# Patient Record
Sex: MALE | URBAN - METROPOLITAN AREA
[De-identification: names, ages, dates, MRNs, and addresses within clinical notes are randomized per-mention and may not be internally consistent; named-entity substitution may affect disease eponyms.]

---

## 2017-09-11 ENCOUNTER — THERAPY VISIT (OUTPATIENT)
Dept: PHYSICAL THERAPY | Facility: CLINIC | Age: 32
End: 2017-09-11
Payer: COMMERCIAL

## 2017-09-11 DIAGNOSIS — M54.42 BILATERAL LOW BACK PAIN WITH LEFT-SIDED SCIATICA: Primary | ICD-10-CM

## 2017-09-11 PROCEDURE — 97161 PT EVAL LOW COMPLEX 20 MIN: CPT | Mod: GP | Performed by: PHYSICAL THERAPIST

## 2017-09-11 PROCEDURE — 97110 THERAPEUTIC EXERCISES: CPT | Mod: GP | Performed by: PHYSICAL THERAPIST

## 2017-09-11 PROCEDURE — 97112 NEUROMUSCULAR REEDUCATION: CPT | Mod: GP | Performed by: PHYSICAL THERAPIST

## 2017-09-11 NOTE — MR AVS SNAPSHOT
After Visit Summary   9/11/2017    Perfecto Higgins    MRN: [id number]           Patient Information     Date Of Birth          1985        Visit Information        Provider Department      9/11/2017 3:00 PM Elzbieta Raphael, PT [facility]        Today's Diagnoses     Bilateral low back pain with left-sided sciatica    -  1       Follow-ups after your visit        Your next 10 appointments already scheduled     Sep 18, 2017  3:00 PM CDT   REE Spine with Elzbieta Raphael PT   [facility] ([facility])    [address]  [facility] [id number] [phone]            Sep 25, 2017  4:20 PM CDT   REE Spine with Elzbieta Raphael PT   [facility] ([facility])    [address]  [facility] [id number] [phone]              Who to contact     If you have questions or need follow up information about today's clinic visit or your schedule please contact [facility] directly at [phone].  Normal or non-critical lab and imaging results will be communicated to you by MyChart, letter or phone within 4 business days after the clinic has received the results. If you do not hear from us within 7 days, please contact the clinic through [employer] or phone. If you have a critical or abnormal lab result, we will notify you by phone as soon as possible.  Submit refill requests through [employer] or call your pharmacy and they will forward the refill request to us. Please allow 3 business days for your refill to be completed.          Additional Information About Your Visit        MyChart Information     [employer] lets you send messages to your doctor, view your test results, renew your prescriptions, schedule appointments and more. To sign up, go to [email or web address] .  "Click on \"Log in\" on the left side of the screen, which will take you to the Welcome page. Then click on \"Sign up Now\" on the right side of the page.     You will be asked to enter the access code listed below, as well as some personal information. Please follow the directions to create your username and password.     Your access code is: [email or web address]  Expires: 2017  8:55 AM     Your access code will  in 90 days. If you need help or a new code, please call your [place] clinic or [phone].        Care EveryWhere ID     This is your Care EveryWhere ID. This could be used by other organizations to access your [place] medical records  [id number]         Blood Pressure from Last 3 Encounters:   No data found for BP    Weight from Last 3 Encounters:   No data found for Wt              We Performed the Following     REE Inital Eval Report     Neuromuscular Re-Education     PT Eval, Low Complexity (33557)     Therapeutic Exercises        Primary Care Provider    None Specified       No primary provider on file.        Equal Access to Services     [facility] : Hadii angelo tran hadasho Soomaali, waaxda luqadaha, qaybta kaalmada adeluis e, scot oropeza . So [facility] [phone].    ATENCIÓN: Si habla español, tiene a sood disposición servicios gratuitos de asistencia lingüística. Llame al [phone].    We comply with applicable federal civil rights laws and [place] laws. We do not discriminate on the basis of race, color, national origin, age, disability sex, sexual orientation or gender identity.            Thank you!     Thank you for choosing [facility]  for your care. Our goal is always to provide you with excellent care. Hearing back from our patients is one way we can continue to improve our services. Please take a few minutes to complete the written survey that you may receive in the mail after your visit with us. Thank " you!             Your Updated Medication List - Protect others around you: Learn how to safely use, store and throw away your medicines at www.disposemymeds.org.      Notice  As of 9/11/2017 11:59 PM    You have not been prescribed any medications.

## 2017-09-11 NOTE — PROGRESS NOTES
[facility] Initial Evaluation    Subjective:    Patient is a 32 year old male presenting with rehab back hpi. The history is provided by the patient. No  was used.   Perfecto Higgins is a 32 year old male with a lumbar condition.  Condition occurred with:  Repetition/overuse.  Condition occurred: at home.  This is a new condition  Patient reports that in June of 2017, he was helping his mom and a friend move and was also making a bench in his back yard which required a lot of lifting work and then bending to make the bench. He noted insidioius onset of lower back pain with this and the pain would come and go. He states that in August, he noted onset of left leg pain. MD order from 9-1-17. He states that he does have a torn meniscus in the L knee, so the left knee pain may be from that as well..    Patient reports pain:  Central lumbar spine and lumbar spine left.  Radiates to:  Gluteals left, foot left, lower leg left and thigh left (posterior knee and ball of the left foot, intermittent posterior L thigh and lower leg).  Pain is described as sharp and aching (inter sharp in back of knee and ache in lower back) and is constant and reported as 5/10.  Associated symptoms:  Tingling and numbness (T>>N in L foot). Pain is worse in the P.M. (pain based more on his activity).  Symptoms are exacerbated by sitting, lifting and bending (sit mua 1 hour with 5-6/10 pain, don shoes and socks with 5-6/10 pain, lift laundry basket/groceries with 5/10 pain, occas awakens at night) and relieved by activity/movement.  Since onset symptoms are unchanged.        General health as reported by patient is good.  Pertinent medical history includes:  Overweight and anemia.  Medical allergies: no.  Other surgeries include:  Other (kidney stones 2012 and 2017, gallbladder removed 206).    Current occupation is .  Patient is working in normal job without restrictions.  Primary job tasks include:   Lifting, prolonged sitting and other (computer work).    Barriers include:  None as reported by the patient.    Red flags:  None as reported by the patient.                        Objective:    System         Lumbar/SI Evaluation  ROM:    AROM Lumbar:   Flexion:          Fingertips to toes and no change  Ext:                    Mod loss and no change   Side Bend:        Left:     Right:   Rotation:           Left:     Right:   Side Glide:        Left:   min loss and no change    Right:  Mod loss and ache          Lumbar Myotomes:            S1 (Toe Raise):  Left: 5-      Lumbar DTR's:  normal      Cord Signs:  normal    Lumbar Dermtomes:  normal                Neural Tension/Mobility:  Lumbar:  Normal        Lumbar Palpation:    Tenderness present at Left:    Quadratus Lumborum and Erector Spinae                                                         Geetha Lumbar Evaluation    Posture:  Sitting: poor  Standing: fair  Lordosis: Reduced  Lateral Shift: no  Correction of Posture: better      Test Movements:  FIS: During: no effect  After: no effect    Repeat FIS: During: increases  After: worse    EIS: During: no effect  After: no effect    Repeat EIS: During: no effect  After: no effect    SALMA: During: no effect  After: no effect    Repeat SALMA: During: no effect  After: no effect    EIL: During: decreases  After: better  Mechanical Response: IncROM          Conclusion: derangement                                         ROS    Assessment/Plan:      Patient is a 32 year old male with lumbar complaints.    Patient has the following significant findings with corresponding treatment plan.                Diagnosis 1:  Lumbar derangement  Pain -  manual therapy, self management, education, directional preference exercise and home program  Decreased ROM/flexibility - manual therapy, therapeutic exercise and home program  Decreased strength - therapeutic exercise, therapeutic activities and home program  Impaired muscle  performance - neuro re-education and home program  Decreased function - therapeutic activities and home program  Impaired posture - neuro re-education and home program    Therapy Evaluation Codes:   1) History comprised of:   Personal factors that impact the plan of care:      None.    Comorbidity factors that impact the plan of care are:      None.     Medications impacting care: None.  2) Examination of Body Systems comprised of:   Body structures and functions that impact the plan of care:      Lumbar spine.   Activity limitations that impact the plan of care are:      Bending, Driving, Dressing, Lifting, Sitting and Sleeping.  3) Clinical presentation characteristics are:   Stable/Uncomplicated.  4) Decision-Making    Low complexity using standardized patient assessment instrument and/or measureable assessment of functional outcome.  Cumulative Therapy Evaluation is: Low complexity.    Previous and current functional limitations:  (See Goal Flow Sheet for this information)    Short term and Long term goals: (See Goal Flow Sheet for this information)     Communication ability:  Patient appears to be able to clearly communicate and understand verbal and written communication and follow directions correctly.  Treatment Explanation - The following has been discussed with the patient:   RX ordered/plan of care  Anticipated outcomes  Possible risks and side effects  This patient would benefit from PT intervention to resume normal activities.   Rehab potential is good.    Frequency:  1-2 X week, once daily  Duration:  for 8 weeks  Discharge Plan:  Achieve all LTG.  Independent in home treatment program.  Reach maximal therapeutic benefit.    Please refer to the daily flowsheet for treatment today, total treatment time and time spent performing 1:1 timed codes.

## 2017-09-11 NOTE — LETTER
[facility] PHYSICAL [facility]  [address]  [place] [id number] [phone]    2017    Re: Perfecto Higgins   :   1985  MRN:  [id number]   REFERRING PHYSICIAN:   Good Farrar    [facility]    Date of Initial Evaluation:  2017  Visits:  Rxs Used: 1  Reason for Referral:  Bilateral low back pain with left-sided sciatica    EVALUATION SUMMARY    [facility]    Subjective:  Patient is a 32 year old male presenting with rehab back hpi. The history is provided by the patient. No  was used.   Perfecto Higgins is a 32 year old male with a lumbar condition.  Condition occurred with:  Repetition/overuse.  Condition occurred: at home.  This is a new condition  Patient reports that in 2017, he was helping his mom and a friend move and was also making a bench in his back yard which required a lot of lifting work and then bending to make the bench. He noted insidioius onset of lower back pain with this and the pain would come and go. He states that in August, he noted onset of left leg pain. MD order from 17. He states that he does have a torn meniscus in the L knee, so the left knee pain may be from that as well..    Patient reports pain:  Central lumbar spine and lumbar spine left. Radiates to: Gluteals left, foot left, lower leg left and thigh left (posterior knee and ball of the left foot, intermittent posterior L thigh and lower leg). Pain is described as sharp and aching (inter sharp in back of knee and ache in lower back) and is constant and reported as 5/10.  Associated symptoms: Tingling and numbness (T>>N in L foot). Pain is worse in the P.M. (pain based more on his activity). Symptoms are exacerbated by sitting, lifting and bending (sit uma 1 hour with 5-6/10 pain, don shoes and socks with 5-6/10 pain, lift laundry  basket/groceries with 5/10 pain, occas awakens at night) and relieved by activity/movement. Since onset symptoms are unchanged. General health as reported by patient is good. Pertinent medical history includes: Overweight and anemia.  Medical allergies: no. Other surgeries include: Other (kidney stones 2012 and 2017, gallbladder removed ). Current occupation is . Patient is working in normal job without restrictions. Primary job tasks include: Lifting, prolonged sitting and other (computer work).  Barriers include:  None as reported by the patient.  Red flags:  None as reported by the patient.      Lumbar/SI Evaluation  Flexion:        Fingertips to toes and no change  Ext:              Mod loss and no change     Re: Perfecto Higgins   :   1985    Side Bend:   Left:     Right:   Rotation:      Left:     Right:   Side Glide:   Left:   min loss and no change    Right:  Mod loss and ache     S1 (Toe Raise):  Left: 5-      Lumbar DTR's:  normal  Cord Signs:  normal  Lumbar Dermtomes:  normal  Neural Tension/Mobility:  Lumbar:  Normal    Tenderness present at Left:    Quadratus Lumborum and Erector Spinae    Geetha Lumbar Evaluation  Sitting: poor  Standing: fair  Lordosis: Reduced  Lateral Shift: no  Correction of Posture: better  Test Movements:  FIS: During: no effect  After: no effect    Repeat FIS: During: increases  After: worse    EIS: During: no effect  After: no effect    Repeat EIS: During: no effect  After: no effect    SALMA: During: no effect  After: no effect    Repeat SALMA: During: no effect  After: no effect    EIL: During: decreases  After: better  Mechanical Response: IncROM  Conclusion: derangement    Assessment/Plan:    Patient is a 32 year old male with lumbar complaints.    Patient has the following significant findings with corresponding treatment plan.                Diagnosis 1:  Lumbar derangement  Pain -  manual therapy, self management, education, directional preference  exercise and home program  Decreased ROM/flexibility - manual therapy, therapeutic exercise and home program  Decreased strength - therapeutic exercise, therapeutic activities and home program  Impaired muscle performance - neuro re-education and home program  Decreased function - therapeutic activities and home program  Impaired posture - neuro re-education and home program    Therapy Evaluation Codes:   1) History comprised of:   Personal factors that impact the plan of care:      None.    Comorbidity factors that impact the plan of care are:      None.     Medications impacting care: None.  2) Examination of Body Systems comprised of:   Body structures and functions that impact the plan of care:      Lumbar spine.   Activity limitations that impact the plan of care are:      Bending, Driving, Dressing, Lifting, Sitting and Sleeping.    Re: Perfecto Higgins   :   1985      3) Clinical presentation characteristics are:   Stable/Uncomplicated.  4) Decision-Making    Low complexity using standardized patient assessment instrument and/or measureable assessment of functional outcome.  Cumulative Therapy Evaluation is: Low complexity.    Previous and current functional limitations:  (See Goal Flow Sheet for this information)    Short term and Long term goals: (See Goal Flow Sheet for this information)     Communication ability:  Patient appears to be able to clearly communicate and understand verbal and written communication and follow directions correctly.  Treatment Explanation - The following has been discussed with the patient:   RX ordered/plan of care  Anticipated outcomes  Possible risks and side effects  This patient would benefit from PT intervention to resume normal activities.   Rehab potential is good.  Frequency:  1-2 X week, once daily  Duration:  for 8 weeks  Discharge Plan:  Achieve all LTG.  Independent in home treatment program.  Reach maximal therapeutic benefit.    Thank you for your  referral.      INQUIRIES  Therapist: Elzbieta Raphael,    [facility]  [address]  [place] [address]  Phone: [phone]  Fax: [phone]

## 2017-09-12 PROBLEM — M54.42 BILATERAL LOW BACK PAIN WITH LEFT-SIDED SCIATICA: Status: ACTIVE | Noted: 2017-09-12

## 2017-09-18 ENCOUNTER — THERAPY VISIT (OUTPATIENT)
Dept: PHYSICAL THERAPY | Facility: CLINIC | Age: 32
End: 2017-09-18
Payer: COMMERCIAL

## 2017-09-18 DIAGNOSIS — M54.42 BILATERAL LOW BACK PAIN WITH LEFT-SIDED SCIATICA: ICD-10-CM

## 2017-09-18 PROCEDURE — 97112 NEUROMUSCULAR REEDUCATION: CPT | Mod: GP | Performed by: PHYSICAL THERAPIST

## 2017-09-18 PROCEDURE — 97140 MANUAL THERAPY 1/> REGIONS: CPT | Mod: GP | Performed by: PHYSICAL THERAPIST

## 2017-09-18 PROCEDURE — 97110 THERAPEUTIC EXERCISES: CPT | Mod: GP | Performed by: PHYSICAL THERAPIST

## 2017-09-18 NOTE — MR AVS SNAPSHOT
After Visit Summary   9/18/2017    Perfecto Higgins    MRN: [id number]           Patient Information     Date Of Birth          1985        Visit Information        Provider Department      9/18/2017 3:00 PM Elzbieta Raphael, PT [facility]        Today's Diagnoses     Bilateral low back pain with left-sided sciatica           Follow-ups after your visit        Your next 10 appointments already scheduled     Sep 25, 2017  4:20 PM CDT   REE Spine with Elzbieta Raphael PT   [facility] ([facility])    [address]  [facility] [id number] [phone]            Oct 02, 2017  3:40 PM CDT   REE Spine with Elzbieta Raphael PT   [facility] ([facility])    [address]  [facility] [id number] [phone]              Who to contact     If you have questions or need follow up information about today's clinic visit or your schedule please contact [facility] directly at [phone].  Normal or non-critical lab and imaging results will be communicated to you by MyChart, letter or phone within 4 business days after the clinic has received the results. If you do not hear from us within 7 days, please contact the clinic through [employer] or phone. If you have a critical or abnormal lab result, we will notify you by phone as soon as possible.  Submit refill requests through [employer] or call your pharmacy and they will forward the refill request to us. Please allow 3 business days for your refill to be completed.          Additional Information About Your Visit        MyChart Information     [employer] lets you send messages to your doctor, view your test results, renew your prescriptions, schedule appointments and more. To sign up, go to [email or web address] .  "Click on \"Log in\" on the left side of the screen, which will take you to the Welcome page. Then click on \"Sign up Now\" on the right side of the page.     You will be asked to enter the access code listed below, as well as some personal information. Please follow the directions to create your username and password.     Your access code is: [email or web address]  Expires: 2017  8:55 AM     Your access code will  in 90 days. If you need help or a new code, please call your [place] clinic or [phone].        Care EveryWhere ID     This is your Care EveryWhere ID. This could be used by other organizations to access your [place] medical records  [id number]         Blood Pressure from Last 3 Encounters:   No data found for BP    Weight from Last 3 Encounters:   No data found for Wt              We Performed the Following     Manual Ther Tech, 1+Regions, EA 15 min     Neuromuscular Re-Education     Therapeutic Exercises        Primary Care Provider    None Specified       No primary provider on file.        Equal Access to Services     [facility]: Hadii angelo ku hadasho Soomaali, waaxda luqadaha, qaybta kaalmada adeegyacande, scot oropeza . So [facility] [phone].    ATENCIÓN: Si habla español, tiene a sood disposición servicios gratuitos de asistencia lingüística. Llame al [phone].    We comply with applicable federal civil rights laws and [place] laws. We do not discriminate on the basis of race, color, national origin, age, disability sex, sexual orientation or gender identity.            Thank you!     Thank you for choosing [facility]  for your care. Our goal is always to provide you with excellent care. Hearing back from our patients is one way we can continue to improve our services. Please take a few minutes to complete the written survey that you may receive in the mail after your visit with us. Thank you!             Your " Updated Medication List - Protect others around you: Learn how to safely use, store and throw away your medicines at www.disposemymeds.org.      Notice  As of 9/18/2017 11:59 PM    You have not been prescribed any medications.

## 2017-09-18 NOTE — PROGRESS NOTES
Subjective:    HPI                    Objective:    System    Physical Exam    General     ROS    Assessment/Plan:      SUBJECTIVE  Subjective changes as noted by pt:  Patient reports that he has more lower back pain, but decrease in his left foot symptoms. He has been doing the exercise about 5 times a day, but more on the weekend.        Current pain level: 6/10     Changes in function:  Yes (See Goal flowsheet attached for changes in current functional level)     Adverse reaction to treatment or activity:  None    OBJECTIVE  Changes in objective findings:  Standing LROM: FB with fingertips to toes and increase L leg Sx, BB mod loss and tightness in lower back, right side glide min loss and no change, and left side glide min loss and no change. Following REIL and manual lumbar extension mobs, pain decreased significantly and improved B side bends.         ASSESSMENT  Perfecto continues to require intervention to meet STG and LTG's: PT  Patient's symptoms are resolving.  Patient is progressing as expected.  Response to therapy has shown an improvement in  pain level, radicular symptoms, posture and function  Progress made towards STG/LTG?  Yes (See Goal flowsheet attached for updates on achievement of STG and LTG)    PLAN  Continue current treatment plan until patient demonstrates readiness to progress to higher level exercises.  The following procedures have been added:  manual therapy    PTA/ATC plan:  N/A    Please refer to the daily flowsheet for treatment today, total treatment time and time spent performing 1:1 timed codes.

## 2017-09-25 ENCOUNTER — THERAPY VISIT (OUTPATIENT)
Dept: PHYSICAL THERAPY | Facility: CLINIC | Age: 32
End: 2017-09-25
Payer: COMMERCIAL

## 2017-09-25 DIAGNOSIS — M54.42 BILATERAL LOW BACK PAIN WITH LEFT-SIDED SCIATICA: ICD-10-CM

## 2017-09-25 PROCEDURE — 97110 THERAPEUTIC EXERCISES: CPT | Mod: GP | Performed by: PHYSICAL THERAPIST

## 2017-09-25 PROCEDURE — 97140 MANUAL THERAPY 1/> REGIONS: CPT | Mod: GP | Performed by: PHYSICAL THERAPIST

## 2017-09-25 NOTE — PROGRESS NOTES
Subjective:    HPI                    Objective:    System    Physical Exam    General     ROS    Assessment/Plan:      SUBJECTIVE  Subjective changes as noted by pt:  Patient reports that he is getting there press ups done 6-8 times a day. He is now standing a lot for meetings, etc at work instead of sitting which aggravates his pain. He is having less pain in the left foot, but if he sits too long it will come on at that time. He feels he has improved by about 25% since starting therapy. His biggest complaint is that he still can't sit much yet. Even the 15' drive home will cause some left foot pain and posterior left thigh pain.        Current pain level: 4/10     Changes in function:  Yes (See Goal flowsheet attached for changes in current functional level)     Adverse reaction to treatment or activity:  None    OBJECTIVE  Changes in objective findings:  Standing LROM: FB with fingertips to toes and slight increase L thigh/foot Sx, left side glide WNL and no pain, and right side glide WNL and no pain. L PF strength now 5/5.        ASSESSMENT  Perfecto continues to require intervention to meet STG and LTG's: PT  Patient's symptoms are resolving.  Response to therapy has shown an improvement in  pain level, radicular symptoms, posture, body mechanics and function  Progress made towards STG/LTG?  Yes (See Goal flowsheet attached for updates on achievement of STG and LTG)    PLAN  Current treatment program is being advanced to more complex exercises.    PTA/ATC plan:  N/A    Please refer to the daily flowsheet for treatment today, total treatment time and time spent performing 1:1 timed codes.

## 2017-09-25 NOTE — MR AVS SNAPSHOT
After Visit Summary   9/25/2017    Perfecto Higgins    MRN: [id number]           Patient Information     Date Of Birth          1985        Visit Information        Provider Department      9/25/2017 4:20 PM Elzbieta Raphael, PT [facility]        Today's Diagnoses     Bilateral low back pain with left-sided sciatica           Follow-ups after your visit        Your next 10 appointments already scheduled     Oct 02, 2017  3:40 PM CDT   REE Spine with Elzbieta Raphael PT   [facility] ([facility])    [address]  [facility] [id number] [phone]            Oct 09, 2017  4:20 PM CDT   REE Spine with Elzbieta Raphael PT   [facility] ([facility])    [address]  [facility] [address] [phone]              Who to contact     If you have questions or need follow up information about today's clinic visit or your schedule please contact [facility] PHYSICAL THERAPY directly at [phone].  Normal or non-critical lab and imaging results will be communicated to you by MyChart, letter or phone within 4 business days after the clinic has received the results. If you do not hear from us within 7 days, please contact the clinic through [employer] or phone. If you have a critical or abnormal lab result, we will notify you by phone as soon as possible.  Submit refill requests through [employer] or call your pharmacy and they will forward the refill request to us. Please allow 3 business days for your refill to be completed.          Additional Information About Your Visit        MyChart Information     [employer] lets you send messages to your doctor, view your test results, renew your prescriptions, schedule appointments and more. To sign up, go to [email or web address] .  "Click on \"Log in\" on the left side of the screen, which will take you to the Welcome page. Then click on \"Sign up Now\" on the right side of the page.     You will be asked to enter the access code listed below, as well as some personal information. Please follow the directions to create your username and password.     Your access code is: [email or web address]  Expires: 2017  8:55 AM     Your access code will  in 90 days. If you need help or a new code, please call your [place] clinic or [phone].        Care EveryWhere ID     This is your Care EveryWhere ID. This could be used by other organizations to access your [place] medical records  [id number]         Blood Pressure from Last 3 Encounters:   No data found for BP    Weight from Last 3 Encounters:   No data found for Wt              We Performed the Following     Manual Ther Tech, 1+Regions, EA 15 min     Therapeutic Exercises        Primary Care Provider    None Specified       No primary provider on file.        Equal Access to Services     [facility]: Hadii aad ku hadasho Soomaali, waaxda luqadaha, qaybta kaalmada adeegyada, waxay alicia oropeza . So [facility] [place] [phone].    ATENCIÓN: Si habla español, tiene a sood disposición servicios gratuitos de asistencia lingüística. Llame al [phone].    We comply with applicable federal civil rights laws and [place] laws. We do not discriminate on the basis of race, color, national origin, age, disability sex, sexual orientation or gender identity.            Thank you!     Thank you for choosing [facility] THERAP[facility]  for your care. Our goal is always to provide you with excellent care. Hearing back from our patients is one way we can continue to improve our services. Please take a few minutes to complete the written survey that you may receive in the mail after your visit with us. Thank you!             Your Updated Medication List - Protect " others around you: Learn how to safely use, store and throw away your medicines at www.disposemymeds.org.      Notice  As of 9/25/2017  5:36 PM    You have not been prescribed any medications.

## 2017-10-02 ENCOUNTER — THERAPY VISIT (OUTPATIENT)
Dept: PHYSICAL THERAPY | Facility: CLINIC | Age: 32
End: 2017-10-02
Payer: COMMERCIAL

## 2017-10-02 DIAGNOSIS — M54.42 BILATERAL LOW BACK PAIN WITH LEFT-SIDED SCIATICA: ICD-10-CM

## 2017-10-02 PROCEDURE — 97140 MANUAL THERAPY 1/> REGIONS: CPT | Mod: GP | Performed by: PHYSICAL THERAPIST

## 2017-10-02 PROCEDURE — 97110 THERAPEUTIC EXERCISES: CPT | Mod: GP | Performed by: PHYSICAL THERAPIST

## 2017-10-02 NOTE — MR AVS SNAPSHOT
After Visit Summary   10/2/2017    Perfecto Higgins    MRN: [id number]           Patient Information     Date Of Birth          1985        Visit Information        Provider Department      10/2/2017 3:40 PM Elzbieta Raphael, PT [facility]        Today's Diagnoses     Bilateral low back pain with left-sided sciatica           Follow-ups after your visit        Your next 10 appointments already scheduled     Oct 09, 2017  4:20 PM CDT   REE Spine with Elzbieta Raphael PT   [facility] ([facility])    [address]  [facility] [id number] [phone]            Oct 16, 2017  4:20 PM CDT   REE Spine with Elzbieta Raphael PT   [facility] ([facility])    [address]  [facility] [id number] [phone]              Who to contact     If you have questions or need follow up information about today's clinic visit or your schedule please contact [facility] PHYSICAL THERAPY directly at [phone].  Normal or non-critical lab and imaging results will be communicated to you by MyChart, letter or phone within 4 business days after the clinic has received the results. If you do not hear from us within 7 days, please contact the clinic through [employer] or phone. If you have a critical or abnormal lab result, we will notify you by phone as soon as possible.  Submit refill requests through [employer] or call your pharmacy and they will forward the refill request to us. Please allow 3 business days for your refill to be completed.          Additional Information About Your Visit        MyChart Information     [employer] lets you send messages to your doctor, view your test results, renew your prescriptions, schedule appointments and more. To sign up, go to [email or web address] .  "Click on \"Log in\" on the left side of the screen, which will take you to the Welcome page. Then click on \"Sign up Now\" on the right side of the page.     You will be asked to enter the access code listed below, as well as some personal information. Please follow the directions to create your username and password.     Your access code is: [email or web address]  Expires: 2017  8:55 AM     Your access code will  in 90 days. If you need help or a new code, please call your [place] clinic or [phone].        Care EveryWhere ID     This is your Care EveryWhere ID. This could be used by other organizations to access your [place] medical records  [id number]         Blood Pressure from Last 3 Encounters:   No data found for BP    Weight from Last 3 Encounters:   No data found for Wt              We Performed the Following     Manual Ther Tech, 1+Regions, EA 15 min     Therapeutic Exercises        Primary Care Provider    None Specified       No primary provider on file.        Equal Access to Services     [facility]: Hadii aad ku hadasho Soomaali, waaxda luqadaha, qaybta kaalmada adeegyada, waxay alicia oropeza . So [facility] [phone].    ATENCIÓN: Si habla español, tiene a sood disposición servicios gratuitos de asistencia lingüística. Llame al [phone].    We comply with applicable federal civil rights laws and [place] laws. We do not discriminate on the basis of race, color, national origin, age, disability, sex, sexual orientation, or gender identity.            Thank you!     Thank you for choosing [facility] PHYSICAL THERAPY  for your care. Our goal is always to provide you with excellent care. Hearing back from our patients is one way we can continue to improve our services. Please take a few minutes to complete the written survey that you may receive in the mail after your visit with us. Thank you!             Your Updated Medication List - " Protect others around you: Learn how to safely use, store and throw away your medicines at www.disposemymeds.org.      Notice  As of 10/2/2017 11:59 PM    You have not been prescribed any medications.

## 2017-10-02 NOTE — PROGRESS NOTES
"Subjective:    HPI                    Objective:    System    Physical Exam    General     ROS    Assessment/Plan:      SUBJECTIVE  Subjective changes as noted by pt:  Patient reports that he did a lot of sitting on and off over several hours on Saturday and it didn't bother much at all. He will notice intermittent \"pinch\" in the left foot if he twists and sometimes if he sits for too long. He does occasional get it into the posterior left thigh when sitting too much. He feels he is about 50% better overall. He is doing the press ups 6-8x/day.  Current Pain level: 4/10   Changes in function:  Yes (See Goal flowsheet attached for changes in current functional level)     Adverse reaction to treatment or activity:  None    OBJECTIVE  Changes in objective findings:  Standing LROM: FB with fingertips to floor and no change and RFIS causes no change, BB mod loss and central lower back pain and CHERRI causes no change. Pain decreased and better following REIL and manual lumbar extension mobs.         ASSESSMENT  Perfecto continues to require intervention to meet STG and LTG's: PT  Patient's symptoms are resolving.  Patient is progressing as expected.  Response to therapy has shown an improvement in  pain level, radicular symptoms, posture and function  Progress made towards STG/LTG?  Yes (See Goal flowsheet attached for updates on achievement of STG and LTG)    PLAN  Current treatment program is being advanced to more complex exercises.    PTA/ATC plan:  N/A    Please refer to the daily flowsheet for treatment today, total treatment time and time spent performing 1:1 timed codes.              "

## 2017-10-09 ENCOUNTER — THERAPY VISIT (OUTPATIENT)
Dept: PHYSICAL THERAPY | Facility: CLINIC | Age: 32
End: 2017-10-09
Payer: COMMERCIAL

## 2017-10-09 DIAGNOSIS — M54.42 BILATERAL LOW BACK PAIN WITH LEFT-SIDED SCIATICA: ICD-10-CM

## 2017-10-09 PROCEDURE — 97140 MANUAL THERAPY 1/> REGIONS: CPT | Mod: GP | Performed by: PHYSICAL THERAPIST

## 2017-10-09 PROCEDURE — 97110 THERAPEUTIC EXERCISES: CPT | Mod: GP | Performed by: PHYSICAL THERAPIST

## 2017-10-09 NOTE — PROGRESS NOTES
Subjective:    HPI                    Objective:    System    Physical Exam    General     ROS    Assessment/Plan:      SUBJECTIVE  Subjective changes as noted by pt:  Patient reports that he did a lot of sitting from Thursday to Saturday for a conference and had much less pain with all the sitting. He is a bit sorer today and he had a lot of meetings, so again was sitting a lot. He had increased lower back pain last Tuesday, but the leg was pretty good. He is doing his press ups about 6-8 times a day. His left leg pain has improved by about 70% since starting therapy. His left leg position does have an affect on how much pain he gets with sitting.         Current pain level: 4/10 -LBP, not leg pain today    Changes in function:  Yes (See Goal flowsheet attached for changes in current functional level)     Adverse reaction to treatment or activity:  None    OBJECTIVE  Changes in objective findings:  Standing LROM: FB with fingertips to floor and no change and RFIS causes slight onset of lower back pain, BB min to mod loss and feels better, and B side glides WNL and no pain.       ASSESSMENT  Perfecto continues to require intervention to meet STG and LTG's: PT  Patient's symptoms are resolving.  Patient is progressing as expected.  Response to therapy has shown an improvement in  pain level, radicular symptoms and function  Progress made towards STG/LTG?  Yes (See Goal flowsheet attached for updates on achievement of STG and LTG)    PLAN  Current treatment program is being advanced to more complex exercises.    PTA/ATC plan:  N/A    Please refer to the daily flowsheet for treatment today, total treatment time and time spent performing 1:1 timed codes.

## 2017-10-09 NOTE — MR AVS SNAPSHOT
After Visit Summary   10/9/2017    Perfecto Higgins    MRN: [id number]           Patient Information     Date Of Birth          1985        Visit Information        Provider Department      10/9/2017 4:20 PM Elzbieta Raphael, PT [facility]        Today's Diagnoses     Bilateral low back pain with left-sided sciatica           Follow-ups after your visit        Your next 10 appointments already scheduled     Oct 16, 2017  4:20 PM CDT   REE Spine with Elzbieta Raphael, PT   [facility] ([facility])    [address]  [facility] [address]   [phone]            Oct 23, 2017  4:20 PM CDT   REE Spine with Elzbieta Raphael PT   [facility] ([facility])    [address]  [facility] [address]   [phone]            Oct 30, 2017  4:20 PM CDT   REE Spine with Elzbieta Raphael PT   [facility] ([facility])    [address]  [facility] [address]   [phone]              Who to contact     If you have questions or need follow up information about today's clinic visit or your schedule please contact [facility] PHYSICAL THERAP[facility] directly at [phone].  Normal or non-critical lab and imaging results will be communicated to you by MyChart, letter or phone within 4 business days after the clinic has received the results. If you do not hear from us within 7 days, please contact the clinic through MyChart or phone. If you have a critical or abnormal lab result, we will notify you by phone as soon as possible.  Submit refill requests through [employer]Cloud or call your pharmacy and they will forward the refill request to us. Please allow 3 business days for your refill to be completed.          Additional  "Information About Your Visit        MyChart Information     [employer] lets you send messages to your doctor, view your test results, renew your prescriptions, schedule appointments and more. To sign up, go to [email or web address] . Click on \"Log in\" on the left side of the screen, which will take you to the Welcome page. Then click on \"Sign up Now\" on the right side of the page.     You will be asked to enter the access code listed below, as well as some personal information. Please follow the directions to create your username and password.     Your access code is: [email or web address]  Expires: 2017  8:55 AM     Your access code will  in 90 days. If you need help or a new code, please call your [place] clinic or [phone].        Care EveryWhere ID     This is your Care EveryWhere ID. This could be used by other organizations to access your [place] medical records  [id number]         Blood Pressure from Last 3 Encounters:   No data found for BP    Weight from Last 3 Encounters:   No data found for Wt              We Performed the Following     Manual Ther Tech, 1+Regions, EA 15 min     Therapeutic Exercises        Primary Care Provider    None Specified       No primary provider on file.        Equal Access to Services     ELE[facility]ER[facility] SANCHE[facility] : Hadaddie West, wachoco latif, qabrianda kaalmada misty, scot shepherd. So [facility] [phone].    ATENCIÓN: Si habla español, tiene a sood disposición servicios gratuitos de asistencia lingüística. Llame al [phone].    We comply with applicable federal civil rights laws and [place] laws. We do not discriminate on the basis of race, color, national origin, age, disability, sex, sexual orientation, or gender identity.            Thank you!     Thank you for choosing [facility] PH[facility]ICAL THERAP[facility]  for your care. Our goal is always to provide you with excellent care. Hearing back from our " patients is one way we can continue to improve our services. Please take a few minutes to complete the written survey that you may receive in the mail after your visit with us. Thank you!             Your Updated Medication List - Protect others around you: Learn how to safely use, store and throw away your medicines at www.disposemymeds.org.      Notice  As of 10/9/2017 11:59 PM    You have not been prescribed any medications.

## 2017-10-16 ENCOUNTER — THERAPY VISIT (OUTPATIENT)
Dept: PHYSICAL THERAPY | Facility: CLINIC | Age: 32
End: 2017-10-16
Payer: COMMERCIAL

## 2017-10-16 DIAGNOSIS — M54.42 BILATERAL LOW BACK PAIN WITH LEFT-SIDED SCIATICA: ICD-10-CM

## 2017-10-16 PROCEDURE — 97110 THERAPEUTIC EXERCISES: CPT | Mod: GP | Performed by: PHYSICAL THERAPIST

## 2017-10-16 PROCEDURE — 97140 MANUAL THERAPY 1/> REGIONS: CPT | Mod: GP | Performed by: PHYSICAL THERAPIST

## 2017-10-16 NOTE — PROGRESS NOTES
Subjective:    HPI                    Objective:    System    Physical Exam    General     ROS    Assessment/Plan:      SUBJECTIVE  Subjective changes as noted by pt:  Patient reports that he is about 70% better since starting therapy. If he gets L foot symptoms, it is with sitting and at times with twisting. He occassionally gets lower back pain. He is avoiding bending as much as is possible.      Current Pain level: 3/10   Changes in function:  Yes (See Goal flowsheet attached for changes in current functional level)     Adverse reaction to treatment or activity:  None    OBJECTIVE  Changes in objective findings:  Standing LROM: FB with fingertips to floor and no pain and RFIS of 10 reps caused no pain, BB mod los and slight cental LBP (but less than last week).     ASSESSMENT  Perfecto continues to require intervention to meet STG and LTG's: PT  Patient's symptoms are resolving.  Patient is progressing as expected.  Response to therapy has shown an improvement in  pain level, radicular symptoms, posture and function  Progress made towards STG/LTG?  Yes (See Goal flowsheet attached for updates on achievement of STG and LTG)    PLAN  Continue current treatment plan until patient demonstrates readiness to progress to higher level exercises.    PTA/ATC plan:  N/A    Please refer to the daily flowsheet for treatment today, total treatment time and time spent performing 1:1 timed codes.

## 2017-10-16 NOTE — MR AVS SNAPSHOT
After Visit Summary   10/16/2017    Perfecto Higgins    MRN: [id number]           Patient Information     Date Of Birth          1985        Visit Information        Provider Department      10/16/2017 4:20 PM Elzbieta Raphael, PT [facility]        Today's Diagnoses     Bilateral low back pain with left-sided sciatica           Follow-ups after your visit        Your next 10 appointments already scheduled     Oct 23, 2017  4:20 PM CDT   REE Spine with Elzbieta Raphael PT   [facility] ([facility])    [address]  [facility] [place] [address] [phone]            Oct 30, 2017  4:20 PM CDT   REE Spine with Elzbieta Raphael PT   [facility] ([facility])    [address]  [facility] [place] [id number] [phone]              Who to contact     If you have questions or need follow up information about today's clinic visit or your schedule please contact [facility] PHYSICAL THERAPY directly at [phone].  Normal or non-critical lab and imaging results will be communicated to you by MyChart, letter or phone within 4 business days after the clinic has received the results. If you do not hear from us within 7 days, please contact the clinic through [employer] or phone. If you have a critical or abnormal lab result, we will notify you by phone as soon as possible.  Submit refill requests through [employer] or call your pharmacy and they will forward the refill request to us. Please allow 3 business days for your refill to be completed.          Additional Information About Your Visit        MyChart Information     [employer] lets you send messages to your doctor, view your test results, renew your prescriptions, schedule appointments and more. To sign up, go to [email or web address] .  "Click on \"Log in\" on the left side of the screen, which will take you to the Welcome page. Then click on \"Sign up Now\" on the right side of the page.     You will be asked to enter the access code listed below, as well as some personal information. Please follow the directions to create your username and password.     Your access code is: [email or web address]  Expires: 2017  8:55 AM     Your access code will  in 90 days. If you need help or a new code, please call your [place] clinic or [phone].        Care EveryWhere ID     This is your Care EveryWhere ID. This could be used by other organizations to access your [place] medical records  [id number]         Blood Pressure from Last 3 Encounters:   No data found for BP    Weight from Last 3 Encounters:   No data found for Wt              We Performed the Following     Manual Ther Tech, 1+Regions, EA 15 min     Therapeutic Exercises        Primary Care Provider    None Specified       No primary provider on file.        Equal Access to Services     [facility]: Hadii aad ku hadasho Soomaali, waaxda luqadaha, qaybta kaalmada adeegyada, waxay alicia oropeza . So [facility] [phone].    ATENCIÓN: Si habla español, tiene a sood disposición servicios gratuitos de asistencia lingüística. Llame al [phone].    We comply with applicable federal civil rights laws and [place] laws. We do not discriminate on the basis of race, color, national origin, age, disability, sex, sexual orientation, or gender identity.            Thank you!     Thank you for choosing [facility] PHYSICAL THERAPY  for your care. Our goal is always to provide you with excellent care. Hearing back from our patients is one way we can continue to improve our services. Please take a few minutes to complete the written survey that you may receive in the mail after your visit with us. Thank you!             Your Updated Medication List - " Protect others around you: Learn how to safely use, store and throw away your medicines at www.disposemymeds.org.      Notice  As of 10/16/2017 11:59 PM    You have not been prescribed any medications.

## 2017-10-30 ENCOUNTER — THERAPY VISIT (OUTPATIENT)
Dept: PHYSICAL THERAPY | Facility: CLINIC | Age: 32
End: 2017-10-30
Payer: COMMERCIAL

## 2017-10-30 DIAGNOSIS — M54.42 BILATERAL LOW BACK PAIN WITH LEFT-SIDED SCIATICA: ICD-10-CM

## 2017-10-30 PROCEDURE — 97110 THERAPEUTIC EXERCISES: CPT | Mod: GP | Performed by: PHYSICAL THERAPIST

## 2017-10-30 PROCEDURE — 97140 MANUAL THERAPY 1/> REGIONS: CPT | Mod: GP | Performed by: PHYSICAL THERAPIST

## 2017-10-30 NOTE — MR AVS SNAPSHOT
"              After Visit Summary   10/30/2017    Perfecto Higgins    MRN: [id number]           Patient Information     Date Of Birth          1985        Visit Information        Provider Department      10/30/2017 4:20 PM Elzbieta Raphael PT [facility]        Today's Diagnoses     Bilateral low back pain with left-sided sciatica           Follow-ups after your visit        Who to contact     If you have questions or need follow up information about today's clinic visit or your schedule please contact [facility] directly at [phone].  Normal or non-critical lab and imaging results will be communicated to you by Veracodehart, letter or phone within 4 business days after the clinic has received the results. If you do not hear from us within 7 days, please contact the clinic through Veracodehart or phone. If you have a critical or abnormal lab result, we will notify you by phone as soon as possible.  Submit refill requests through [employer] or call your pharmacy and they will forward the refill request to us. Please allow 3 business days for your refill to be completed.          Additional Information About Your Visit        MyChart Information     [employer] lets you send messages to your doctor, view your test results, renew your prescriptions, schedule appointments and more. To sign up, go to [email or web address] . Click on \"Log in\" on the left side of the screen, which will take you to the Welcome page. Then click on \"Sign up Now\" on the right side of the page.     You will be asked to enter the access code listed below, as well as some personal information. Please follow the directions to create your username and password.     Your access code is: [email or web address]  Expires: 2017  8:55 AM     Your access code will  in 90 days. If you need help or a new code, please call your [facility] clinic or " [phone].        Care EveryWhere ID     This is your Care EveryWhere ID. This could be used by other organizations to access your [place] medical records  [id number]         Blood Pressure from Last 3 Encounters:   No data found for BP    Weight from Last 3 Encounters:   No data found for Wt              We Performed the Following     REE Progress Notes Report     Manual Ther Tech, 1+Regions, EA 15 min     Therapeutic Exercises        Primary Care Provider Office Phone # Fax #    Good Farrar [phone]       [address] [address]        Equal Access to Services     [facility]: Hadii aad ku hadasho Soomaali, waaxda luqadaha, qaybta kaalmada adeegyada, scot vargas hayjustine adesally oropeza . So [facility] [phone].    ATENCIÓN: Si habla español, tiene a sood disposición servicios gratuitos de asistencia lingüística. [facility] [phone].    We comply with applicable federal civil rights laws and [place] laws. We do not discriminate on the basis of race, color, national origin, age, disability, sex, sexual orientation, or gender identity.            Thank you!     Thank you for choosing [facility]  for your care. Our goal is always to provide you with excellent care. Hearing back from our patients is one way we can continue to improve our services. Please take a few minutes to complete the written survey that you may receive in the mail after your visit with us. Thank you!             Your Updated Medication List - Protect others around you: Learn how to safely use, store and throw away your medicines at www.disposemymeds.org.      Notice  As of 10/30/2017 11:59 PM    You have not been prescribed any medications.

## 2017-10-30 NOTE — LETTER
[facility]ICAL THERAP[facility]  [address] [facility]  [place] [id number] [phone]  2017  Re: Perfecto Higgins   :   1985  MRN:  [id number]   REFERRING PHYSICIAN:   Good Farrar  [facility]  Date of Initial Evaluation:  17  Visits:     Reason for Referral:  Bilateral low back pain with left-sided sciatica  DISCHARGE REPORT  Progress reporting period is from 17 to 10-30-17.       SUBJECTIVE  Subjective changes noted by patient:  Patient reports that he still gets pain into the bottom of the left foot and he does get this every day and he is not really sure why he gets this pain. He does have some intermittent lower back pain every day, but it is more when he does the press ups. Patient reports that he is 75% improved since starting therapy.       Current Pain level: 2/10.     Previous pain level was 5/10  .   Changes in function:  Yes (See Goal flowsheet attached for changes in current functional level)  Adverse reaction to treatment or activity: None    OBJECTIVE  Changes noted in objective findings:  Standing LROM: FB with fingertips to toes and no change and RFIS caused an increase in L foot pain, BB mod loss with mild lower back pain and CHERRI and slight decrease in L foot pain,        ASSESSMENT/PLAN  Updated problem list and treatment plan: Diagnosis 1:  Lumbar derangement  Pain -  manual therapy, self management, education, directional preference exercise and home program  Decreased ROM/flexibility - manual therapy, therapeutic exercise and home program  Decreased joint mobility - manual therapy, therapeutic exercise and home program  Impaired muscle performance - neuro re-education and home program  Decreased function - therapeutic activities and home program  STG/LTGs have been met or progress has been made towards goals:  Yes (See Goal flow sheet completed today.)  Assessment  of Progress: The patient's condition is improving.  The patient's condition has potential to improve.  Patient is meeting short term goals and is progressing towards long term goals.  Self Management Plans:  Patient has been instructed in a home treatment program.  Patient  has been instructed in self management of symptoms.  Perfecto continues to require the following intervention to meet STG and LTG's:  PT intervention is no longer required to meet STG/LTG.    Recommendations:  This patient is ready to be discharged from therapy and continue their home treatment program.    Thank you for your referral.    INQUIRIES  Therapist: Elzbieta Raphael, PT  [facility]  [address] [place] [address]. [address]  [facility] [address]  Phone: [phone]  Fax: [phone]

## 2017-10-30 NOTE — PROGRESS NOTES
Subjective:    HPI                    Objective:    System    Physical Exam    General     ROS    Assessment/Plan:      DISCHARGE REPORT    Progress reporting period is from 9-11-17 to 10-30-17.       SUBJECTIVE  Subjective changes noted by patient:  Patient reports that he still gets pain into the bottom of the left foot and he does get this every day and he is not really sure why he gets this pain. He does have some intermittent lower back pain every day, but it is more when he does the press ups. Patient reports that he is 75% improved since starting therapy.       Current Pain level: 2/10.     Previous pain level was 5/10  .   Changes in function:  Yes (See Goal flowsheet attached for changes in current functional level)  Adverse reaction to treatment or activity: None    OBJECTIVE  Changes noted in objective findings:  Standing LROM: FB with fingertips to toes and no change and RFIS caused an increase in L foot pain, BB mod loss with mild lower back pain and CHERRI and slight decrease in L foot pain,          ASSESSMENT/PLAN  Updated problem list and treatment plan: Diagnosis 1:  Lumbar derangement  Pain -  manual therapy, self management, education, directional preference exercise and home program  Decreased ROM/flexibility - manual therapy, therapeutic exercise and home program  Decreased joint mobility - manual therapy, therapeutic exercise and home program  Impaired muscle performance - neuro re-education and home program  Decreased function - therapeutic activities and home program  STG/LTGs have been met or progress has been made towards goals:  Yes (See Goal flow sheet completed today.)  Assessment of Progress: The patient's condition is improving.  The patient's condition has potential to improve.  Patient is meeting short term goals and is progressing towards long term goals.  Self Management Plans:  Patient has been instructed in a home treatment program.  Patient  has been instructed in self management  of symptoms.    Perfecto continues to require the following intervention to meet STG and LTG's:  PT intervention is no longer required to meet STG/LTG.    Recommendations:  This patient is ready to be discharged from therapy and continue their home treatment program.    Please refer to the daily flowsheet for treatment today, total treatment time and time spent performing 1:1 timed codes.

## 2017-10-31 PROBLEM — M54.42 BILATERAL LOW BACK PAIN WITH LEFT-SIDED SCIATICA: Status: RESOLVED | Noted: 2017-09-12 | Resolved: 2017-10-31

## 2021-05-25 ENCOUNTER — RECORDS - HEALTHEAST (OUTPATIENT)
Dept: ADMINISTRATIVE | Facility: CLINIC | Age: 36
End: 2021-05-25